# Patient Record
Sex: MALE | Race: ASIAN | NOT HISPANIC OR LATINO
[De-identification: names, ages, dates, MRNs, and addresses within clinical notes are randomized per-mention and may not be internally consistent; named-entity substitution may affect disease eponyms.]

---

## 2017-03-30 ENCOUNTER — APPOINTMENT (OUTPATIENT)
Dept: UROLOGY | Facility: CLINIC | Age: 72
End: 2017-03-30

## 2018-04-26 ENCOUNTER — APPOINTMENT (OUTPATIENT)
Dept: UROLOGY | Facility: CLINIC | Age: 73
End: 2018-04-26
Payer: MEDICARE

## 2018-04-26 PROCEDURE — 99214 OFFICE O/P EST MOD 30 MIN: CPT

## 2018-11-26 ENCOUNTER — APPOINTMENT (OUTPATIENT)
Dept: UROLOGY | Facility: CLINIC | Age: 73
End: 2018-11-26
Payer: MEDICARE

## 2018-11-26 PROCEDURE — 99214 OFFICE O/P EST MOD 30 MIN: CPT

## 2018-11-26 RX ORDER — DOCUSATE SODIUM 100 MG/1
100 CAPSULE ORAL
Qty: 60 | Refills: 3 | Status: ACTIVE | COMMUNITY
Start: 2018-11-26 | End: 1900-01-01

## 2018-11-27 ENCOUNTER — MEDICATION RENEWAL (OUTPATIENT)
Age: 73
End: 2018-11-27

## 2018-11-27 ENCOUNTER — EMERGENCY (EMERGENCY)
Facility: HOSPITAL | Age: 73
LOS: 1 days | Discharge: HOME | End: 2018-11-27
Attending: EMERGENCY MEDICINE
Payer: MEDICARE

## 2018-11-27 VITALS
SYSTOLIC BLOOD PRESSURE: 130 MMHG | TEMPERATURE: 98 F | DIASTOLIC BLOOD PRESSURE: 76 MMHG | OXYGEN SATURATION: 97 % | RESPIRATION RATE: 18 BRPM | HEART RATE: 82 BPM

## 2018-11-27 VITALS
WEIGHT: 113.98 LBS | OXYGEN SATURATION: 95 % | TEMPERATURE: 98 F | DIASTOLIC BLOOD PRESSURE: 84 MMHG | SYSTOLIC BLOOD PRESSURE: 142 MMHG | HEIGHT: 64 IN | HEART RATE: 94 BPM | RESPIRATION RATE: 19 BRPM

## 2018-11-27 LAB
ALBUMIN SERPL ELPH-MCNC: 4 G/DL — SIGNIFICANT CHANGE UP (ref 3.3–5)
ALP SERPL-CCNC: 64 U/L — SIGNIFICANT CHANGE UP (ref 40–120)
ALT FLD-CCNC: 7 U/L — LOW (ref 10–45)
ANION GAP SERPL CALC-SCNC: 15 MMOL/L — SIGNIFICANT CHANGE UP (ref 5–17)
APPEARANCE UR: ABNORMAL
AST SERPL-CCNC: 13 U/L — SIGNIFICANT CHANGE UP (ref 10–40)
BACTERIA # UR AUTO: NEGATIVE — SIGNIFICANT CHANGE UP
BASOPHILS # BLD AUTO: 0 K/UL — SIGNIFICANT CHANGE UP (ref 0–0.2)
BASOPHILS NFR BLD AUTO: 0.5 % — SIGNIFICANT CHANGE UP (ref 0–2)
BILIRUB SERPL-MCNC: 0.8 MG/DL — SIGNIFICANT CHANGE UP (ref 0.2–1.2)
BILIRUB UR-MCNC: ABNORMAL
BUN SERPL-MCNC: 22 MG/DL — SIGNIFICANT CHANGE UP (ref 7–23)
CALCIUM SERPL-MCNC: 9.4 MG/DL — SIGNIFICANT CHANGE UP (ref 8.4–10.5)
CHLORIDE SERPL-SCNC: 102 MMOL/L — SIGNIFICANT CHANGE UP (ref 96–108)
CO2 SERPL-SCNC: 20 MMOL/L — LOW (ref 22–31)
COLOR SPEC: ABNORMAL
CREAT SERPL-MCNC: 1.47 MG/DL — HIGH (ref 0.5–1.3)
DIFF PNL FLD: ABNORMAL
EOSINOPHIL # BLD AUTO: 0.1 K/UL — SIGNIFICANT CHANGE UP (ref 0–0.5)
EOSINOPHIL NFR BLD AUTO: 1.4 % — SIGNIFICANT CHANGE UP (ref 0–6)
EPI CELLS # UR: 4 /HPF — SIGNIFICANT CHANGE UP
GLUCOSE SERPL-MCNC: 94 MG/DL — SIGNIFICANT CHANGE UP (ref 70–99)
GLUCOSE UR QL: NEGATIVE — SIGNIFICANT CHANGE UP
HCT VFR BLD CALC: 39.5 % — SIGNIFICANT CHANGE UP (ref 39–50)
HGB BLD-MCNC: 13.3 G/DL — SIGNIFICANT CHANGE UP (ref 13–17)
HYALINE CASTS # UR AUTO: 15 /LPF — HIGH (ref 0–2)
KETONES UR-MCNC: NEGATIVE — SIGNIFICANT CHANGE UP
LEUKOCYTE ESTERASE UR-ACNC: ABNORMAL
LYMPHOCYTES # BLD AUTO: 1 K/UL — SIGNIFICANT CHANGE UP (ref 1–3.3)
LYMPHOCYTES # BLD AUTO: 10 % — LOW (ref 13–44)
MCHC RBC-ENTMCNC: 32.8 PG — SIGNIFICANT CHANGE UP (ref 27–34)
MCHC RBC-ENTMCNC: 33.6 GM/DL — SIGNIFICANT CHANGE UP (ref 32–36)
MCV RBC AUTO: 97.7 FL — SIGNIFICANT CHANGE UP (ref 80–100)
MONOCYTES # BLD AUTO: 1 K/UL — HIGH (ref 0–0.9)
MONOCYTES NFR BLD AUTO: 10.3 % — SIGNIFICANT CHANGE UP (ref 2–14)
NEUTROPHILS # BLD AUTO: 7.8 K/UL — HIGH (ref 1.8–7.4)
NEUTROPHILS NFR BLD AUTO: 77.7 % — HIGH (ref 43–77)
NITRITE UR-MCNC: POSITIVE
PH UR: 6 — SIGNIFICANT CHANGE UP (ref 5–8)
PLATELET # BLD AUTO: 186 K/UL — SIGNIFICANT CHANGE UP (ref 150–400)
POTASSIUM SERPL-MCNC: 3.5 MMOL/L — SIGNIFICANT CHANGE UP (ref 3.5–5.3)
POTASSIUM SERPL-SCNC: 3.5 MMOL/L — SIGNIFICANT CHANGE UP (ref 3.5–5.3)
PROT SERPL-MCNC: 7.7 G/DL — SIGNIFICANT CHANGE UP (ref 6–8.3)
PROT UR-MCNC: ABNORMAL
RBC # BLD: 4.04 M/UL — LOW (ref 4.2–5.8)
RBC # FLD: 13.2 % — SIGNIFICANT CHANGE UP (ref 10.3–14.5)
RBC CASTS # UR COMP ASSIST: 36 /HPF — HIGH (ref 0–4)
SODIUM SERPL-SCNC: 137 MMOL/L — SIGNIFICANT CHANGE UP (ref 135–145)
SP GR SPEC: 1.03 — HIGH (ref 1.01–1.02)
UROBILINOGEN FLD QL: ABNORMAL
WBC # BLD: 10.1 K/UL — SIGNIFICANT CHANGE UP (ref 3.8–10.5)
WBC # FLD AUTO: 10.1 K/UL — SIGNIFICANT CHANGE UP (ref 3.8–10.5)
WBC UR QL: 76 /HPF — HIGH (ref 0–5)

## 2018-11-27 PROCEDURE — 99284 EMERGENCY DEPT VISIT MOD MDM: CPT

## 2018-11-27 PROCEDURE — 80053 COMPREHEN METABOLIC PANEL: CPT

## 2018-11-27 PROCEDURE — 85027 COMPLETE CBC AUTOMATED: CPT

## 2018-11-27 PROCEDURE — 99283 EMERGENCY DEPT VISIT LOW MDM: CPT

## 2018-11-27 PROCEDURE — 87086 URINE CULTURE/COLONY COUNT: CPT

## 2018-11-27 PROCEDURE — 81001 URINALYSIS AUTO W/SCOPE: CPT

## 2018-11-27 RX ORDER — ASPIRIN/CALCIUM CARB/MAGNESIUM 324 MG
0 TABLET ORAL
Qty: 0 | Refills: 0 | COMMUNITY

## 2018-11-27 RX ORDER — PHENAZOPYRIDINE HYDROCHLORIDE 100 MG/1
100 TABLET ORAL 3 TIMES DAILY
Qty: 15 | Refills: 0 | Status: ACTIVE | COMMUNITY
Start: 2018-11-26 | End: 1900-01-01

## 2018-11-27 RX ORDER — FINASTERIDE 5 MG/1
1 TABLET, FILM COATED ORAL
Qty: 0 | Refills: 0 | COMMUNITY

## 2018-11-27 RX ORDER — SODIUM CHLORIDE 9 MG/ML
1000 INJECTION INTRAMUSCULAR; INTRAVENOUS; SUBCUTANEOUS ONCE
Qty: 0 | Refills: 0 | Status: COMPLETED | OUTPATIENT
Start: 2018-11-27 | End: 2018-11-27

## 2018-11-27 RX ORDER — GEMFIBROZIL 600 MG
1 TABLET ORAL
Qty: 0 | Refills: 0 | COMMUNITY

## 2018-11-27 RX ORDER — TAMSULOSIN HYDROCHLORIDE 0.4 MG/1
1 CAPSULE ORAL
Qty: 0 | Refills: 0 | COMMUNITY

## 2018-11-27 RX ORDER — METOCLOPRAMIDE HCL 10 MG
1 TABLET ORAL
Qty: 0 | Refills: 0 | COMMUNITY

## 2018-11-27 RX ADMIN — SODIUM CHLORIDE 1000 MILLILITER(S): 9 INJECTION INTRAMUSCULAR; INTRAVENOUS; SUBCUTANEOUS at 18:57

## 2018-11-27 NOTE — ED PROVIDER NOTE - PROGRESS NOTE DETAILS
nursing did post void and found only 60 mL. - Melissa Gore PA-C nursing did post void and found only 60 mL. - Melissa Gore PA-C.   Patient made aware of cre.  stanley gallardo/ radha.

## 2018-11-27 NOTE — ED PROVIDER NOTE - OBJECTIVE STATEMENT
72 y/o male hx BPH on tamsulosin and finasteride follows with Dr. Rodney Lutz who presents to the ED for 3 days of urinary burning and frequency. patient saw dr. Lutz yesterday who started him on cipro, pyridium and colace as he reports no BM in 2 days as well. no flank pain/hematuria. no fever/chills/n/v/d. took 3 doses of cipro and reports persistent symptoms.   translation provided by patients daughter at bedside, offered translation and declined.

## 2018-11-27 NOTE — ED ADULT NURSE NOTE - CHIEF COMPLAINT QUOTE
dysuria x 2 days, seen by urologist, prescribed cipro, took 3 doses without relief, (seen at hospital in [facility] for "stomache virus")  *returned from [facility] on nov 19

## 2018-11-27 NOTE — ED ADULT NURSE NOTE - OBJECTIVE STATEMENT
Pt bib daughter for further evaluation of dysuria over past few days.  He has taken 3 doses of Cipro, without improvement of sx.  Denies fevers, chills.

## 2018-11-27 NOTE — ED ADULT TRIAGE NOTE - CHIEF COMPLAINT QUOTE
dysuria x 2 days, seen by urologist, prescribed cipro, took 3 doses without relief, (seen at hospital in [facility] for "stomache virus")  *returned from [facility] on nov 19

## 2018-11-27 NOTE — ED ADULT NURSE REASSESSMENT NOTE - NS ED NURSE REASSESS COMMENT FT1
pt voided in bathroom at 1930; pt states the volume was "little"; Bladder scan done at 1935 was 62 cc; DREW NGUYEN made aware

## 2018-11-27 NOTE — ED PROVIDER NOTE - MEDICAL DECISION MAKING DETAILS
Miguelangel: Patient with uti diagnosed outpatient, here for increased in frequency of urination and dysuria. Patient took 3 doses of cipro. no fever, no chills. will get labs, ua, reassess.

## 2018-11-28 LAB
CULTURE RESULTS: NO GROWTH — SIGNIFICANT CHANGE UP
SPECIMEN SOURCE: SIGNIFICANT CHANGE UP

## 2018-12-02 LAB
APPEARANCE: ABNORMAL
BACTERIA UR CULT: ABNORMAL
BACTERIA: ABNORMAL
BILIRUBIN URINE: NEGATIVE
BLOOD URINE: ABNORMAL
C TRACH RRNA SPEC QL NAA+PROBE: NOT DETECTED
COLOR: ABNORMAL
GLUCOSE QUALITATIVE U: NEGATIVE MG/DL
HYALINE CASTS: 0 /LPF
KETONES URINE: NEGATIVE
LEUKOCYTE ESTERASE URINE: ABNORMAL
MICROSCOPIC-UA: NORMAL
N GONORRHOEA RRNA SPEC QL NAA+PROBE: NOT DETECTED
NITRITE URINE: POSITIVE
PH URINE: 6
PROTEIN URINE: 100 MG/DL
RED BLOOD CELLS URINE: 12 /HPF
SOURCE AMPLIFICATION: NORMAL
SPECIFIC GRAVITY URINE: 1.03
SQUAMOUS EPITHELIAL CELLS: 3 /HPF
UROBILINOGEN URINE: 1 MG/DL
WHITE BLOOD CELLS URINE: 20 /HPF

## 2018-12-13 LAB
APPEARANCE: CLEAR
BACTERIA UR CULT: ABNORMAL
BACTERIA: ABNORMAL
BILIRUBIN URINE: NEGATIVE
BLOOD URINE: ABNORMAL
COLOR: YELLOW
GLUCOSE QUALITATIVE U: NEGATIVE MG/DL
HYALINE CASTS: 0 /LPF
KETONES URINE: NEGATIVE
LEUKOCYTE ESTERASE URINE: ABNORMAL
MICROSCOPIC-UA: NORMAL
NITRITE URINE: POSITIVE
PH URINE: 6.5
PROTEIN URINE: ABNORMAL MG/DL
RED BLOOD CELLS URINE: 5 /HPF
SPECIFIC GRAVITY URINE: 1.02
SQUAMOUS EPITHELIAL CELLS: 2 /HPF
URINE COMMENTS: NORMAL
UROBILINOGEN URINE: NEGATIVE MG/DL
WHITE BLOOD CELLS URINE: 11 /HPF

## 2018-12-13 RX ORDER — CIPROFLOXACIN HYDROCHLORIDE 500 MG/1
500 TABLET, FILM COATED ORAL
Qty: 14 | Refills: 0 | Status: ACTIVE | COMMUNITY
Start: 2018-11-26 | End: 1900-01-01

## 2018-12-14 ENCOUNTER — CLINICAL ADVICE (OUTPATIENT)
Age: 73
End: 2018-12-14

## 2019-01-10 ENCOUNTER — LABORATORY RESULT (OUTPATIENT)
Age: 74
End: 2019-01-10

## 2019-01-11 LAB
APPEARANCE: ABNORMAL
BACTERIA UR CULT: NORMAL
BILIRUBIN URINE: NEGATIVE
BLOOD URINE: NEGATIVE
COLOR: YELLOW
GLUCOSE QUALITATIVE U: NEGATIVE MG/DL
KETONES URINE: NEGATIVE
LEUKOCYTE ESTERASE URINE: ABNORMAL
NITRITE URINE: NEGATIVE
PH URINE: 5.5
PROTEIN URINE: NEGATIVE MG/DL
SPECIFIC GRAVITY URINE: 1.02
UROBILINOGEN URINE: NEGATIVE MG/DL

## 2019-04-11 ENCOUNTER — APPOINTMENT (OUTPATIENT)
Dept: UROLOGY | Facility: CLINIC | Age: 74
End: 2019-04-11
Payer: MEDICARE

## 2019-04-11 DIAGNOSIS — B00.1 HERPESVIRAL VESICULAR DERMATITIS: ICD-10-CM

## 2019-04-11 PROBLEM — N40.0 BENIGN PROSTATIC HYPERPLASIA WITHOUT LOWER URINARY TRACT SYMPTOMS: Chronic | Status: ACTIVE | Noted: 2018-11-27

## 2019-04-11 PROCEDURE — 99214 OFFICE O/P EST MOD 30 MIN: CPT

## 2019-04-11 NOTE — LETTER BODY
[FreeTextEntry1] : Eliel Torres MD\par [address] [facility] [address]\[place] [address]\par [phone] (W)\par [phone] (F)\par \par Dear Dr. Torres,\par \par Reason for visit: BPH. Elevated PSA. \par \par This is a 73 -year-old gentleman with elevated PSA and chronic BPH status post 3 previously negative prostate biopsies. The patient returns today for follow up. Since the patient's previous visit, he continues to take Proscar and Flomax. Patient reports taking the medications regularly without any side effects or difficulties with the medication. Patient reports stable urinary flow on medications. He reports nocturia 2-3x. He notes a large urine output. His symptoms are aggravated by hydration. Patient denies any urinary retention or hematuria or changes in health. Patient has no pain. The past medical history and family history and social history are unchanged. All other review of systems are negative. Patient denies any changes in medications. Medication list was reconciled.\par \par On examination, the patient is a healthy-appearing gentleman in no acute distress. He is alert and oriented follows commands. He has normal mood and affect. He is normocephalic. Oral no thrush. Neck is supple. Respirations are unlabored. His abdomen is soft and nontender. Liver is nonpalpable. Bladder is nonpalpable. No CVA tenderness. Neurologically he is grossly intact. No peripheral edema. Skin without gross abnormality. He has normal male external genitalia. Normal meatus. Bilateral testes are descended intrascrotally and normal to palpation. On rectal examination, there is normal sphincter tone. The prostate is clinically benign without focal induration or nodularity.\par \par His most recent PSA in April 2018 was 3.19, which is within normal limits.\par \par Previous urinalysis was negative for hematuria or pyuria. Urine culture was negative. Chlamydia/ GC Amplification negative. \par \par Assessment: BPH, symptoms improved with Proscar and Flomax. Elevated PSA.\par \par I counseled the patient. He is doing well. His last PSA was normal. He will obtain BMP and PSA today to ensure stability. His urinary symptoms are stable on medications. I renewed his prescriptions for Flomax and Proscar today. I encouraged him to continue medications. I advised him to limit his fluid intake in the evening to relieve his nocturia. Risks and alternatives were discussed. I answered the patient questions. The patient will follow-up as directed and will contact me with any questions or concerns. Thank you for the opportunity to participate in the care of Mr. BARRIENTOS. I'll keep you updated on his progress.\par \par Plan: BMP. PSA. Continue Flomax and Proscar. Follow up in 1 year. \par \par

## 2019-04-11 NOTE — ADDENDUM
[FreeTextEntry1] : Entered by Karen Cancino, acting as scribe for Dr. Lutz. \par \par The documentation recorded by the scribe accurately reflects the service I personally performed and the decisions made by me.\par

## 2019-04-17 LAB
ANION GAP SERPL CALC-SCNC: 13 MMOL/L
BUN SERPL-MCNC: 19 MG/DL
CALCIUM SERPL-MCNC: 9.5 MG/DL
CHLORIDE SERPL-SCNC: 106 MMOL/L
CO2 SERPL-SCNC: 24 MMOL/L
CREAT SERPL-MCNC: 1.24 MG/DL
GLUCOSE SERPL-MCNC: 90 MG/DL
POTASSIUM SERPL-SCNC: 4.3 MMOL/L
PSA FREE FLD-MCNC: 23 %
PSA FREE SERPL-MCNC: 1.16 NG/ML
PSA SERPL-MCNC: 5.07 NG/ML
SODIUM SERPL-SCNC: 143 MMOL/L

## 2019-04-17 RX ORDER — ENEMA 19; 7 G/133ML; G/133ML
7-19 ENEMA RECTAL
Qty: 2 | Refills: 0 | Status: ACTIVE | COMMUNITY
Start: 2019-04-17 | End: 1900-01-01

## 2019-04-19 ENCOUNTER — OTHER (OUTPATIENT)
Age: 74
End: 2019-04-19

## 2019-04-25 NOTE — ED ADULT NURSE NOTE - NSIMPLEMENTINTERV_GEN_ALL_ED
Implemented All Universal Safety Interventions:  [place] to call system. Call bell, personal items and telephone within reach. Instruct patient to call for assistance. Room bathroom lighting operational. Non-slip footwear when patient is off stretcher. Physically safe environment: no spills, clutter or unnecessary equipment. Stretcher in lowest position, wheels locked, appropriate side rails in place.
never used

## 2019-06-01 ENCOUNTER — FORM ENCOUNTER (OUTPATIENT)
Age: 74
End: 2019-06-01

## 2019-06-02 ENCOUNTER — APPOINTMENT (OUTPATIENT)
Dept: MRI IMAGING | Facility: IMAGING CENTER | Age: 74
End: 2019-06-02
Payer: MEDICARE

## 2019-06-02 ENCOUNTER — OUTPATIENT (OUTPATIENT)
Dept: OUTPATIENT SERVICES | Facility: HOSPITAL | Age: 74
LOS: 1 days | End: 2019-06-02
Payer: MEDICARE

## 2019-06-02 ENCOUNTER — APPOINTMENT (OUTPATIENT)
Dept: CT IMAGING | Facility: IMAGING CENTER | Age: 74
End: 2019-06-02
Payer: MEDICARE

## 2019-06-02 DIAGNOSIS — N13.8 OTHER OBSTRUCTIVE AND REFLUX UROPATHY: ICD-10-CM

## 2019-06-02 DIAGNOSIS — N40.1 BENIGN PROSTATIC HYPERPLASIA WITH LOWER URINARY TRACT SYMPTOMS: ICD-10-CM

## 2019-06-02 DIAGNOSIS — R97.20 ELEVATED PROSTATE SPECIFIC ANTIGEN [PSA]: ICD-10-CM

## 2019-06-02 PROCEDURE — 71250 CT THORAX DX C-: CPT | Mod: 26

## 2019-06-02 PROCEDURE — 71250 CT THORAX DX C-: CPT

## 2019-06-02 PROCEDURE — 72197 MRI PELVIS W/O & W/DYE: CPT | Mod: 26

## 2019-06-02 PROCEDURE — 72197 MRI PELVIS W/O & W/DYE: CPT

## 2019-06-02 PROCEDURE — A9585: CPT

## 2019-06-08 ENCOUNTER — TRANSCRIPTION ENCOUNTER (OUTPATIENT)
Age: 74
End: 2019-06-08

## 2019-06-20 ENCOUNTER — APPOINTMENT (OUTPATIENT)
Dept: UROLOGY | Facility: CLINIC | Age: 74
End: 2019-06-20
Payer: MEDICARE

## 2019-06-20 PROCEDURE — 99214 OFFICE O/P EST MOD 30 MIN: CPT

## 2019-06-20 NOTE — ADDENDUM
[FreeTextEntry1] : Entered by Marissa Herring, acting as scribe for Dr. Rodney Lutz.\par \par The documentation recorded by the scribe accurately reflects the service I personally performed and the decisions made by me.

## 2019-06-20 NOTE — LETTER BODY
[FreeTextEntry1] : Eliel Torres MD\par [address] [facility] [address]\[place] [address]\par [phone] (W)\par [phone] (F)\par \par Dear Dr. Torres,\par \par Reason for visit: BPH. Elevated PSA. \par \par This is a 73 -year-old gentleman with elevated PSA and chronic BPH status post 3 previously negative prostate biopsies. The patient returns today for follow up. Since the patient's previous visit, he continues to take Proscar and Flomax regularly without any side effects or difficulties with the medication. Patient previously underwent prostate MRI demonstrating PIRADS 3 lesion. Patient reports of gross hematuria a few days ago. He reports nocturia 2-3x. He notes a large urine output. His symptoms are aggravated by hydration. Patient denies any urinary retention or hematuria or changes in health. Patient has no pain. The past medical history and family history and social history are unchanged. All other review of systems are negative. Patient denies any changes in medications. Medication list was reconciled.\par \par On examination, the patient is a healthy-appearing gentleman in no acute distress. He is alert and oriented follows commands. He has normal mood and affect. He is normocephalic. Oral no thrush. Neck is supple. Respirations are unlabored. His abdomen is soft and nontender. Liver is nonpalpable. Bladder is nonpalpable. No CVA tenderness. Neurologically he is grossly intact. No peripheral edema. Skin without gross abnormality. He has normal male external genitalia. Normal meatus. Bilateral testes are descended intrascrotally and normal to palpation. On rectal examination, there is normal sphincter tone. The prostate is clinically benign without focal induration or nodularity. His prostate is 71 cc.\par \par His Prostate MRI demonstrated PIRADS 3 lesion. His prostate is 71 cc, which is enlarged.\par \par Assessment: BPH, symptoms improved with Proscar and Flomax. Elevated PSA.\par \par I counseled the patient. He is doing well. His prostate MRI demonstrated PIRADS 3 lesion. His urinary symptoms are stable on medications. I renewed his prescriptions for Proscar today. I encouraged him to continue medications. I encourage the patient to increase Flomax to twice a day. I advised him to limit his fluid intake in the evening to relieve his nocturia. Risks and alternatives were discussed. I answered the patient questions. The patient will follow-up as directed and will contact me with any questions or concerns. Thank you for the opportunity to participate in the care of Mr. BARRIENTOS. I'll keep you updated on his progress.\par \par Plan: Increase Flomax to BID. Continue Proscar. Follow up in 6 months.

## 2019-06-23 LAB
APPEARANCE: CLEAR
BACTERIA: NEGATIVE
BILIRUBIN URINE: NEGATIVE
BLOOD URINE: NEGATIVE
COLOR: NORMAL
GLUCOSE QUALITATIVE U: NEGATIVE
HYALINE CASTS: 0 /LPF
KETONES URINE: NEGATIVE
LEUKOCYTE ESTERASE URINE: NEGATIVE
MICROSCOPIC-UA: NORMAL
NITRITE URINE: NEGATIVE
PH URINE: 6.5
PROTEIN URINE: NORMAL
RED BLOOD CELLS URINE: 2 /HPF
SPECIFIC GRAVITY URINE: 1.02
SQUAMOUS EPITHELIAL CELLS: 0 /HPF
URINE CYTOLOGY: NORMAL
UROBILINOGEN URINE: NORMAL
WHITE BLOOD CELLS URINE: 0 /HPF

## 2019-07-02 ENCOUNTER — TRANSCRIPTION ENCOUNTER (OUTPATIENT)
Age: 74
End: 2019-07-02

## 2019-12-12 ENCOUNTER — APPOINTMENT (OUTPATIENT)
Dept: UROLOGY | Facility: CLINIC | Age: 74
End: 2019-12-12
Payer: MEDICARE

## 2019-12-12 PROCEDURE — 99214 OFFICE O/P EST MOD 30 MIN: CPT

## 2019-12-12 NOTE — ADDENDUM
[FreeTextEntry1] : Entered by Jose Mccormick, acting as scribe for Dr. Rodney Lutz.\par \par The documentation recorded by the scribe accurately reflects the service I personally performed and the decisions made by me.

## 2019-12-12 NOTE — LETTER BODY
[FreeTextEntry1] : Eliel Torres MD\par [address] [facility] [address]\[place] [address]\par [phone] (W)\par [phone] (F)\par \par Dear Dr. Torres,\par \par Reason for visit: BPH. Elevated PSA. \par \par This is a 74 year-old Cantonese-speaking gentleman with elevated PSA and chronic BPH status post 3 previously negative prostate biopsies. Patient previously underwent prostate MRI demonstrating PIRADS 3 lesion. The patient returns today for follow up, accompanied by his wife. Since his last visit, the patient denies any interval difficulties or complaints. He continues to take Flomax twice a day and Proscar regularly without any side effects or difficulties with the medication. Patient denies any urinary retention, hematuria, or changes in health. Patient has no pain. The past medical history and family history and social history are unchanged. All other review of systems are negative. Patient denies any changes in medications. Medication list was reconciled.\par \par On examination, the patient is a healthy-appearing gentleman in no acute distress. He is alert and oriented follows commands. He has normal mood and affect. He is normocephalic. Oral no thrush. Neck is supple. Respirations are unlabored. His abdomen is soft and nontender. Liver is nonpalpable. Bladder is nonpalpable. No CVA tenderness. Neurologically he is grossly intact. No peripheral edema. Skin without gross abnormality. He has normal male external genitalia. Normal meatus. Bilateral testes are descended intrascrotally and normal to palpation. On rectal examination, there is normal sphincter tone. The prostate is clinically benign without focal induration or nodularity. \par \par His prostate MRI demonstrated PIRADS 3 lesion. His prostate is 71 cc, which is enlarged.\par \par His current PSA is 5, which is elevated.\par \par Assessment: BPH, symptoms improved with Proscar and Flomax BID. Elevated PSA.\par \par I counseled the patient. He is doing well. His urinary symptoms are stable on medications. I renewed the patient's prescription for Flomax BID and Proscar today. I encouraged the patient to continue medications regularly as directed. In terms of his elevated PSA, I recommend he repeat PSA today to ensure stability. Risks and alternatives were discussed. I answered the patient questions. The patient will follow-up as directed and will contact me with any questions or concerns. Thank you for the opportunity to participate in the care of Mr. BARRIENTOS. I'll keep you updated on his progress.\par \par Plan: Continue Flomax BID and Proscar. Repeat PSA. Follow up in 1 year.

## 2019-12-14 LAB
PSA FREE FLD-MCNC: 25 %
PSA FREE SERPL-MCNC: 1.06 NG/ML
PSA SERPL-MCNC: 4.21 NG/ML

## 2020-12-10 ENCOUNTER — APPOINTMENT (OUTPATIENT)
Dept: UROLOGY | Facility: CLINIC | Age: 75
End: 2020-12-10
Payer: MEDICARE

## 2020-12-10 VITALS
DIASTOLIC BLOOD PRESSURE: 82 MMHG | WEIGHT: 141 LBS | BODY MASS INDEX: 24.07 KG/M2 | SYSTOLIC BLOOD PRESSURE: 143 MMHG | TEMPERATURE: 97.7 F | HEIGHT: 64 IN | HEART RATE: 75 BPM

## 2020-12-10 PROCEDURE — 99214 OFFICE O/P EST MOD 30 MIN: CPT

## 2020-12-10 PROCEDURE — 99072 ADDL SUPL MATRL&STAF TM PHE: CPT

## 2020-12-10 NOTE — ADDENDUM
[FreeTextEntry1] : Entered by Beni Lynn, acting as scribe for Dr. Rodney Lutz.\par \par The documentation recorded by the scribe accurately reflects the service I personally performed and the decisions made by me.\par

## 2020-12-10 NOTE — LETTER BODY
[FreeTextEntry1] : Eliel Torres MD\par [address] [facility] [address]\[place] [address]\par [phone] (W)\par [phone] (F)\par \par Dear Dr. Torres,\par \par Reason for visit: BPH. Elevated PSA. \par \par This is a 74 year-old Cantonese-speaking gentleman with chronic BPH, and elevated PSA status post 3 previously negative prostate biopsies. His prostate MRI in June 2019 demonstrated an enlarged prostate of 71 cc, and a PI-RADS 3 lesion. He returns today for follow-up. Since he was last seen, the patient continues to report taking Flomax BID and Proscar regularly without any side effects or difficulties. He reports of stable urinary symptoms with medical therapy. Patient denies any urinary retention, hematuria, or changes in health. He denies any pain. The past medical history and family history and social history are unchanged. All other review of systems are negative. Patient denies any changes in medications. Medication list was reconciled.\par \par On examination, the patient is a healthy-appearing gentleman in no acute distress. He is alert and oriented follows commands. He has normal mood and affect. He is normocephalic. Oral no thrush. Neck is supple. Respirations are unlabored. His abdomen is soft and nontender. Liver is nonpalpable. Bladder is nonpalpable. No CVA tenderness. Neurologically he is grossly intact. No peripheral edema. Skin without gross abnormality. He has normal male external genitalia. Normal meatus. Bilateral testes are descended intrascrotally and normal to palpation. On rectal examination, there is normal sphincter tone. The prostate is clinically benign without focal induration or nodularity. \par \par His PSA is 4, which is improved. His previous PSA in December 2019 was 4.21.\par \par Assessment: BPH, symptoms stable with Flomax BID and Proscar. Elevated PSA.\par \par I counseled the patient. In terms of his BPH, since his symptoms are stable, I recommended he continue taking Flomax BID and Proscar. I renewed the patient's prescription for Flomax and Proscar today. I encouraged the patient to continue medications regularly as directed. In terms of his elevated PSA, I reassured the patient. His PSA is now 4, which is improved. He will follow-up in one year to ensure stability. Risks and alternatives were discussed. I answered the patient questions. The patient will follow-up as directed and will contact me with any questions or concerns. Thank you for the opportunity to participate in the care of Mr. BARRIENTOS. I will keep you updated on his progress.\par \par Plan: Continue Flomax BID and Proscar. Follow-up in 1 year.

## 2021-12-16 ENCOUNTER — APPOINTMENT (OUTPATIENT)
Dept: UROLOGY | Facility: CLINIC | Age: 76
End: 2021-12-16
Payer: MEDICARE

## 2021-12-16 PROCEDURE — 99214 OFFICE O/P EST MOD 30 MIN: CPT

## 2021-12-16 NOTE — LETTER BODY
[FreeTextEntry1] : Eliel Torres MD\par [address] [facility] [address]\[place] [address]\par [phone] (W)\par [phone] (F)\par \par Dear Dr. Torres,\par \par Reason for visit: BPH. Elevated PSA. \par \par This is a 76 year-old Cantonese-speaking gentleman with chronic BPH, and elevated PSA status post 3 previously negative prostate biopsies. His prostate MRI in June 2019 demonstrated an enlarged prostate of 71 cc, and a PI-RADS 3 lesion. He returns today for follow-up. Since he was last seen, the patient continues to report taking Flomax BID and Proscar regularly without any side effects or difficulties. He reports good uroflow and stable urinary symptoms with medical therapy. Patient denies any urinary retention, hematuria, or changes in health. He denies any pain. The past medical history and family history and social history are unchanged. All other review of systems are negative. Patient denies any changes in medications. Medication list was reconciled.\par \par On examination, the patient is a healthy-appearing gentleman in no acute distress. He is alert and oriented follows commands. He has normal mood and affect. He is normocephalic. Oral no thrush. Neck is supple. Respirations are unlabored. His abdomen is soft and nontender. Liver is nonpalpable. Bladder is nonpalpable. No CVA tenderness. Neurologically he is grossly intact. No peripheral edema. Skin without gross abnormality. He has normal male external genitalia. Normal meatus. Bilateral testes are descended intrascrotally and normal to palpation. On rectal examination, there is normal sphincter tone. The prostate is clinically benign without focal induration or nodularity. \par \par His BMP in June 2021 demonstrated normal renal functions, creatinine 1.10. His PSA is 4.01, which is stable and within normal limits.  His urinalysis demonstrated trace amounts of protein, but otherwise unremarkable. \par \par Assessment: BPH, symptoms stable with Flomax BID and Proscar. Elevated PSA.\par \par I counseled the patient. He is doing well. In terms of his BPH, the patient reports stable urinary symptoms with medical therapy.  I recommended he continue taking Flomax BID and Proscar. I renewed the patient's prescription for Flomax and Proscar today. I encouraged the patient to continue medications regularly as directed. In terms of his elevated PSA, I reassured the patient. His PSA is stable and within normal limits.  He will follow-up in one year to ensure stability. Risks and alternatives were discussed. I answered the patient questions. The patient will follow-up as directed and will contact me with any questions or concerns. Thank you for the opportunity to participate in the care of Mr. BARRIENTOS. I will keep you updated on his progress.\par \par Plan: Continue Flomax BID and Proscar. Follow-up in 1 year.

## 2021-12-16 NOTE — HISTORY OF PRESENT ILLNESS
[FreeTextEntry1] : Please refer to URO Consult note \par \par \par FUA elevated PSA and BPH \par PSA 4.01 stable \par PI-RADS 3 MRI - 2 years ago\par continue meds for BPH  \par come back in 1 yr

## 2021-12-16 NOTE — ADDENDUM
[FreeTextEntry1] : Entered by Marquita Cortez, acting as scribe for Dr. Rodney Lutz.\par \par The documentation recorded by the scribe accurately reflects the service I personally performed and the decisions made by me.

## 2022-12-22 ENCOUNTER — APPOINTMENT (OUTPATIENT)
Dept: UROLOGY | Facility: CLINIC | Age: 77
End: 2022-12-22

## 2022-12-22 DIAGNOSIS — R97.20 ELEVATED PROSTATE, SPECIFIC ANTIGEN [PSA]: ICD-10-CM

## 2022-12-22 DIAGNOSIS — Z00.00 ENCOUNTER FOR GENERAL ADULT MEDICAL EXAMINATION W/OUT ABNORMAL FINDINGS: ICD-10-CM

## 2022-12-22 LAB
ANION GAP SERPL CALC-SCNC: 14 MMOL/L
BUN SERPL-MCNC: 22 MG/DL
CALCIUM SERPL-MCNC: 9.7 MG/DL
CHLORIDE SERPL-SCNC: 106 MMOL/L
CO2 SERPL-SCNC: 20 MMOL/L
CREAT SERPL-MCNC: 1.2 MG/DL
EGFR: 63 ML/MIN/1.73M2
GLUCOSE SERPL-MCNC: 71 MG/DL
POTASSIUM SERPL-SCNC: 4.3 MMOL/L
PSA FREE FLD-MCNC: 30 %
PSA FREE SERPL-MCNC: 0.99 NG/ML
PSA SERPL-MCNC: 3.32 NG/ML
SODIUM SERPL-SCNC: 140 MMOL/L

## 2022-12-22 PROCEDURE — 99214 OFFICE O/P EST MOD 30 MIN: CPT

## 2022-12-22 NOTE — LETTER BODY
[FreeTextEntry1] : Eliel Torres MD\par [address] [facility] [address]\[place] [address]\par [phone] (W)\par [phone] (F)\par \par Dear Dr. Torres,\par \par Reason for visit: BPH. Elevated PSA. \par \par This is a 77 year-old Cantonese-speaking gentleman with chronic BPH, and elevated PSA status post 3 previously negative prostate biopsies. His prostate MRI in June 2019 demonstrated an enlarged prostate of 71 cc, and a PI-RADS 3 lesion. He returns today for follow-up. Since he was last seen, the patient continues to report taking Flomax BID and Proscar regularly without any side effects or difficulties. He reports good uroflow and stable urinary symptoms with medical therapy. Patient denies any urinary retention, hematuria, or changes in health. He denies any pain. The past medical history and family history and social history are unchanged. All other review of systems are negative. Patient denies any changes in medications. Medication list was reconciled.\par \par On examination, the patient is a healthy-appearing gentleman in no acute distress. He is alert and oriented follows commands. He has normal mood and affect. He is normocephalic. Oral no thrush. Neck is supple. Respirations are unlabored. His abdomen is soft and nontender. Liver is nonpalpable. Bladder is nonpalpable. No CVA tenderness. Neurologically he is grossly intact. No peripheral edema. Skin without gross abnormality. He has normal male external genitalia. Normal meatus. Bilateral testes are descended intrascrotally and normal to palpation. On rectal examination, there is normal sphincter tone. The prostate is clinically benign without focal induration or nodularity. \par \par His BMP in June 2021 demonstrated normal renal functions, creatinine 1.10. His PSA is 4.01, which is stable and within normal limits.  His urinalysis demonstrated trace amounts of protein, but otherwise unremarkable. \par \par Assessment: BPH, symptoms stable with Flomax BID and Proscar. Elevated PSA.\par \par I counseled the patient. He is doing well. In terms of his BPH, the patient reports stable urinary symptoms with medical therapy.  I recommended he continue taking Flomax BID and Proscar. I renewed the patient's prescription for Flomax and Proscar today. I encouraged the patient to continue medications regularly as directed. In terms of his elevated PSA, I reassured the patient. His PSA is stable and within normal limits. I encouraged him to repeat PSA and BMP to ensure stability. Risks and alternatives were discussed. I answered the patient questions. The patient will follow-up as directed and will contact me with any questions or concerns. Thank you for the opportunity to participate in the care of Mr. BARRIENTOS. I will keep you updated on his progress.\par \par Plan: Continue Flomax BID and Proscar. PSA. BMP. Follow-up in 1 year.

## 2022-12-22 NOTE — ADDENDUM
[FreeTextEntry1] : Entered by Adriana Noel, acting as scribe for Dr. Rodney Lutz.\par The documentation recorded by the scribe accurately reflects the service I personally performed and the decisions made by me.

## 2023-01-26 ENCOUNTER — NON-APPOINTMENT (OUTPATIENT)
Age: 78
End: 2023-01-26

## 2023-12-29 ENCOUNTER — EMERGENCY (EMERGENCY)
Facility: HOSPITAL | Age: 78
LOS: 1 days | Discharge: HOME | End: 2023-12-29
Attending: INTERNAL MEDICINE | Admitting: INTERNAL MEDICINE
Payer: COMMERCIAL

## 2023-12-29 VITALS
HEIGHT: 64 IN | TEMPERATURE: 99 F | HEART RATE: 94 BPM | WEIGHT: 149.91 LBS | SYSTOLIC BLOOD PRESSURE: 143 MMHG | DIASTOLIC BLOOD PRESSURE: 82 MMHG | OXYGEN SATURATION: 94 % | RESPIRATION RATE: 18 BRPM

## 2023-12-29 PROCEDURE — 99284 EMERGENCY DEPT VISIT MOD MDM: CPT

## 2023-12-30 VITALS
SYSTOLIC BLOOD PRESSURE: 121 MMHG | OXYGEN SATURATION: 95 % | TEMPERATURE: 98 F | HEART RATE: 80 BPM | RESPIRATION RATE: 18 BRPM | DIASTOLIC BLOOD PRESSURE: 60 MMHG

## 2023-12-30 LAB
ALBUMIN SERPL ELPH-MCNC: 3.5 G/DL — SIGNIFICANT CHANGE UP (ref 3.3–5)
ALBUMIN SERPL ELPH-MCNC: 3.5 G/DL — SIGNIFICANT CHANGE UP (ref 3.3–5)
ALP SERPL-CCNC: 39 U/L — LOW (ref 40–120)
ALP SERPL-CCNC: 39 U/L — LOW (ref 40–120)
ALT FLD-CCNC: 17 U/L — SIGNIFICANT CHANGE UP (ref 12–78)
ALT FLD-CCNC: 17 U/L — SIGNIFICANT CHANGE UP (ref 12–78)
ANION GAP SERPL CALC-SCNC: 2 MMOL/L — LOW (ref 5–17)
ANION GAP SERPL CALC-SCNC: 2 MMOL/L — LOW (ref 5–17)
APPEARANCE UR: CLEAR — SIGNIFICANT CHANGE UP
APPEARANCE UR: CLEAR — SIGNIFICANT CHANGE UP
AST SERPL-CCNC: 17 U/L — SIGNIFICANT CHANGE UP (ref 15–37)
AST SERPL-CCNC: 17 U/L — SIGNIFICANT CHANGE UP (ref 15–37)
BILIRUB SERPL-MCNC: 0.6 MG/DL — SIGNIFICANT CHANGE UP (ref 0.2–1.2)
BILIRUB SERPL-MCNC: 0.6 MG/DL — SIGNIFICANT CHANGE UP (ref 0.2–1.2)
BILIRUB UR-MCNC: NEGATIVE — SIGNIFICANT CHANGE UP
BILIRUB UR-MCNC: NEGATIVE — SIGNIFICANT CHANGE UP
BUN SERPL-MCNC: 26 MG/DL — HIGH (ref 7–23)
BUN SERPL-MCNC: 26 MG/DL — HIGH (ref 7–23)
CALCIUM SERPL-MCNC: 8.9 MG/DL — SIGNIFICANT CHANGE UP (ref 8.5–10.1)
CALCIUM SERPL-MCNC: 8.9 MG/DL — SIGNIFICANT CHANGE UP (ref 8.5–10.1)
CHLORIDE SERPL-SCNC: 110 MMOL/L — HIGH (ref 96–108)
CHLORIDE SERPL-SCNC: 110 MMOL/L — HIGH (ref 96–108)
CO2 SERPL-SCNC: 28 MMOL/L — SIGNIFICANT CHANGE UP (ref 22–31)
CO2 SERPL-SCNC: 28 MMOL/L — SIGNIFICANT CHANGE UP (ref 22–31)
COLOR SPEC: YELLOW — SIGNIFICANT CHANGE UP
COLOR SPEC: YELLOW — SIGNIFICANT CHANGE UP
CREAT SERPL-MCNC: 0.98 MG/DL — SIGNIFICANT CHANGE UP (ref 0.5–1.3)
CREAT SERPL-MCNC: 0.98 MG/DL — SIGNIFICANT CHANGE UP (ref 0.5–1.3)
DIFF PNL FLD: NEGATIVE — SIGNIFICANT CHANGE UP
DIFF PNL FLD: NEGATIVE — SIGNIFICANT CHANGE UP
EGFR: 80 ML/MIN/1.73M2 — SIGNIFICANT CHANGE UP
EGFR: 80 ML/MIN/1.73M2 — SIGNIFICANT CHANGE UP
GLUCOSE SERPL-MCNC: 91 MG/DL — SIGNIFICANT CHANGE UP (ref 70–99)
GLUCOSE SERPL-MCNC: 91 MG/DL — SIGNIFICANT CHANGE UP (ref 70–99)
GLUCOSE UR QL: NEGATIVE MG/DL — SIGNIFICANT CHANGE UP
GLUCOSE UR QL: NEGATIVE MG/DL — SIGNIFICANT CHANGE UP
HCT VFR BLD CALC: 37.5 % — LOW (ref 39–50)
HCT VFR BLD CALC: 37.5 % — LOW (ref 39–50)
HGB BLD-MCNC: 12.4 G/DL — LOW (ref 13–17)
HGB BLD-MCNC: 12.4 G/DL — LOW (ref 13–17)
KETONES UR-MCNC: NEGATIVE MG/DL — SIGNIFICANT CHANGE UP
KETONES UR-MCNC: NEGATIVE MG/DL — SIGNIFICANT CHANGE UP
LEUKOCYTE ESTERASE UR-ACNC: NEGATIVE — SIGNIFICANT CHANGE UP
LEUKOCYTE ESTERASE UR-ACNC: NEGATIVE — SIGNIFICANT CHANGE UP
MCHC RBC-ENTMCNC: 32.3 PG — SIGNIFICANT CHANGE UP (ref 27–34)
MCHC RBC-ENTMCNC: 32.3 PG — SIGNIFICANT CHANGE UP (ref 27–34)
MCHC RBC-ENTMCNC: 33.1 GM/DL — SIGNIFICANT CHANGE UP (ref 32–36)
MCHC RBC-ENTMCNC: 33.1 GM/DL — SIGNIFICANT CHANGE UP (ref 32–36)
MCV RBC AUTO: 97.7 FL — SIGNIFICANT CHANGE UP (ref 80–100)
MCV RBC AUTO: 97.7 FL — SIGNIFICANT CHANGE UP (ref 80–100)
NITRITE UR-MCNC: NEGATIVE — SIGNIFICANT CHANGE UP
NITRITE UR-MCNC: NEGATIVE — SIGNIFICANT CHANGE UP
NRBC # BLD: 0 /100 WBCS — SIGNIFICANT CHANGE UP (ref 0–0)
NRBC # BLD: 0 /100 WBCS — SIGNIFICANT CHANGE UP (ref 0–0)
PH UR: 5.5 — SIGNIFICANT CHANGE UP (ref 5–8)
PH UR: 5.5 — SIGNIFICANT CHANGE UP (ref 5–8)
PLATELET # BLD AUTO: 147 K/UL — LOW (ref 150–400)
PLATELET # BLD AUTO: 147 K/UL — LOW (ref 150–400)
POTASSIUM SERPL-MCNC: 4 MMOL/L — SIGNIFICANT CHANGE UP (ref 3.5–5.3)
POTASSIUM SERPL-MCNC: 4 MMOL/L — SIGNIFICANT CHANGE UP (ref 3.5–5.3)
POTASSIUM SERPL-SCNC: 4 MMOL/L — SIGNIFICANT CHANGE UP (ref 3.5–5.3)
POTASSIUM SERPL-SCNC: 4 MMOL/L — SIGNIFICANT CHANGE UP (ref 3.5–5.3)
PROT SERPL-MCNC: 7.2 G/DL — SIGNIFICANT CHANGE UP (ref 6–8.3)
PROT SERPL-MCNC: 7.2 G/DL — SIGNIFICANT CHANGE UP (ref 6–8.3)
PROT UR-MCNC: NEGATIVE MG/DL — SIGNIFICANT CHANGE UP
PROT UR-MCNC: NEGATIVE MG/DL — SIGNIFICANT CHANGE UP
RAPID RVP RESULT: SIGNIFICANT CHANGE UP
RAPID RVP RESULT: SIGNIFICANT CHANGE UP
RBC # BLD: 3.84 M/UL — LOW (ref 4.2–5.8)
RBC # BLD: 3.84 M/UL — LOW (ref 4.2–5.8)
RBC # FLD: 13.1 % — SIGNIFICANT CHANGE UP (ref 10.3–14.5)
RBC # FLD: 13.1 % — SIGNIFICANT CHANGE UP (ref 10.3–14.5)
SARS-COV-2 RNA SPEC QL NAA+PROBE: SIGNIFICANT CHANGE UP
SARS-COV-2 RNA SPEC QL NAA+PROBE: SIGNIFICANT CHANGE UP
SODIUM SERPL-SCNC: 140 MMOL/L — SIGNIFICANT CHANGE UP (ref 135–145)
SODIUM SERPL-SCNC: 140 MMOL/L — SIGNIFICANT CHANGE UP (ref 135–145)
SP GR SPEC: 1.02 — SIGNIFICANT CHANGE UP (ref 1–1.03)
SP GR SPEC: 1.02 — SIGNIFICANT CHANGE UP (ref 1–1.03)
UROBILINOGEN FLD QL: 1 MG/DL — SIGNIFICANT CHANGE UP (ref 0.2–1)
UROBILINOGEN FLD QL: 1 MG/DL — SIGNIFICANT CHANGE UP (ref 0.2–1)
WBC # BLD: 5.36 K/UL — SIGNIFICANT CHANGE UP (ref 3.8–10.5)
WBC # BLD: 5.36 K/UL — SIGNIFICANT CHANGE UP (ref 3.8–10.5)
WBC # FLD AUTO: 5.36 K/UL — SIGNIFICANT CHANGE UP (ref 3.8–10.5)
WBC # FLD AUTO: 5.36 K/UL — SIGNIFICANT CHANGE UP (ref 3.8–10.5)

## 2023-12-30 PROCEDURE — 99284 EMERGENCY DEPT VISIT MOD MDM: CPT | Mod: 25

## 2023-12-30 PROCEDURE — 36415 COLL VENOUS BLD VENIPUNCTURE: CPT

## 2023-12-30 PROCEDURE — 87086 URINE CULTURE/COLONY COUNT: CPT

## 2023-12-30 PROCEDURE — 71045 X-RAY EXAM CHEST 1 VIEW: CPT | Mod: 26

## 2023-12-30 PROCEDURE — 0225U NFCT DS DNA&RNA 21 SARSCOV2: CPT

## 2023-12-30 PROCEDURE — 71045 X-RAY EXAM CHEST 1 VIEW: CPT

## 2023-12-30 PROCEDURE — 85027 COMPLETE CBC AUTOMATED: CPT

## 2023-12-30 PROCEDURE — 81003 URINALYSIS AUTO W/O SCOPE: CPT

## 2023-12-30 PROCEDURE — 80053 COMPREHEN METABOLIC PANEL: CPT

## 2023-12-30 PROCEDURE — 96374 THER/PROPH/DIAG INJ IV PUSH: CPT

## 2023-12-30 RX ORDER — SODIUM CHLORIDE 9 MG/ML
1000 INJECTION INTRAMUSCULAR; INTRAVENOUS; SUBCUTANEOUS ONCE
Refills: 0 | Status: COMPLETED | OUTPATIENT
Start: 2023-12-30 | End: 2023-12-30

## 2023-12-30 RX ORDER — ONDANSETRON 8 MG/1
4 TABLET, FILM COATED ORAL ONCE
Refills: 0 | Status: COMPLETED | OUTPATIENT
Start: 2023-12-30 | End: 2023-12-30

## 2023-12-30 RX ORDER — ONDANSETRON 8 MG/1
1 TABLET, FILM COATED ORAL
Qty: 15 | Refills: 0
Start: 2023-12-30 | End: 2024-01-03

## 2023-12-30 RX ADMIN — SODIUM CHLORIDE 1000 MILLILITER(S): 9 INJECTION INTRAMUSCULAR; INTRAVENOUS; SUBCUTANEOUS at 01:27

## 2023-12-30 RX ADMIN — ONDANSETRON 4 MILLIGRAM(S): 8 TABLET, FILM COATED ORAL at 01:27

## 2023-12-30 NOTE — ED PROVIDER NOTE - PATIENT PORTAL LINK FT
You can access the FollowMyHealth Patient Portal offered by [facility] by registering at the following website: [email or web address]. By joining [employer]’s FollowMyHealth portal, you will also be able to view your health information using other applications (apps) compatible with our system. You can access the FollowMyHealth Patient Portal offered by [facility] by registering at the following website: [email or web address]. By joining [employer]’s FollowMyHealth portal, you will also be able to view your health information using other applications (apps) compatible with our system.

## 2023-12-30 NOTE — ED ADULT NURSE NOTE - NSFALLUNIVINTERV_ED_ALL_ED
Bed/Stretcher in lowest position, wheels locked, appropriate side rails in place/Call bell, personal items and telephone in reach/Instruct patient to call for assistance before getting out of bed/chair/stretcher/Non-slip footwear applied when patient is off stretcher/[place] to call system/Physically safe environment - no spills, clutter or unnecessary equipment/Purposeful proactive rounding/Room/bathroom lighting operational, light cord in reach Bed/Stretcher in lowest position, wheels locked, appropriate side rails in place/Call bell, personal items and telephone in reach/Instruct patient to call for assistance before getting out of bed/chair/stretcher/Non-slip footwear applied when patient is off stretcher/[place] to call system/Physically safe environment - no spills, clutter or unnecessary equipment/Purposeful proactive rounding/Room/bathroom lighting operational, light cord in reach

## 2023-12-30 NOTE — ED PROVIDER NOTE - OBJECTIVE STATEMENT
c/o weakness and nausea x 2 days ago speaks chinese and Cantonese  daughter at side  nausea, weakness  No c/o weakness and nausea x 2 days ago speaks chinese and Cantonese  daughter at side  nausea, weakness  chills, no fever. nausea no vomiting 77 y/o male c/o weakness and nausea x 2 days ago speaks chinese and Cantonese  daughter at side  no headache, no rash no toxemia, no chest pain, no SOB, no palpitations, no fever, no abdominal pain,  no v/d, no urinary symptoms, no GI  bleeding. no neuro changes. 79 y/o male c/o weakness and nausea x 2 days ago speaks chinese and Cantonese  daughter at side  no headache, no rash no toxemia, no chest pain, no SOB, no palpitations, no fever, no abdominal pain,  no v/d, no urinary symptoms, no GI  bleeding. no neuro changes.

## 2023-12-30 NOTE — ED ADULT NURSE NOTE - OBJECTIVE STATEMENT
Pt c/o weakness and nausea X 2 days, pt speaks cantonese but understands simple english instructions. no deficits noted on exam pt able to ambulate to restroom. family at bedside

## 2023-12-30 NOTE — ED PROVIDER NOTE - SIGNIFICANT NEGATIVE FINDINGS
no headache, no rash no toxemia, no chest pain, no SOB, no palpitations, no fever, no abdominal pain,  no v/d, no urinary symptoms, no GI  bleeding. no neuro changes.

## 2023-12-30 NOTE — ED PROVIDER NOTE - CARE PROVIDER_API CALL
Marky Rivers  Gastroenterology  [address] [place] [address]  Phone: [phone]  Fax: [phone]  Follow Up Time: 1-3 Days   Marky Rivers  Gastroenterology  [address] [place] [address]  Phone: [phone]  Fax: [phone]  Follow Up Time: 1-3 Days

## 2023-12-31 LAB
CULTURE RESULTS: SIGNIFICANT CHANGE UP
CULTURE RESULTS: SIGNIFICANT CHANGE UP
SPECIMEN SOURCE: SIGNIFICANT CHANGE UP
SPECIMEN SOURCE: SIGNIFICANT CHANGE UP

## 2024-01-04 ENCOUNTER — APPOINTMENT (OUTPATIENT)
Dept: UROLOGY | Facility: CLINIC | Age: 79
End: 2024-01-04
Payer: MEDICARE

## 2024-01-04 DIAGNOSIS — N13.8 BENIGN PROSTATIC HYPERPLASIA WITH LOWER URINARY TRACT SYMPMS: ICD-10-CM

## 2024-01-04 DIAGNOSIS — N40.1 BENIGN PROSTATIC HYPERPLASIA WITH LOWER URINARY TRACT SYMPMS: ICD-10-CM

## 2024-01-04 LAB
ANION GAP SERPL CALC-SCNC: 12 MMOL/L
BUN SERPL-MCNC: 24 MG/DL
CALCIUM SERPL-MCNC: 9.4 MG/DL
CHLORIDE SERPL-SCNC: 108 MMOL/L
CO2 SERPL-SCNC: 23 MMOL/L
CREAT SERPL-MCNC: 1.24 MG/DL
EGFR: 60 ML/MIN/1.73M2
GLUCOSE SERPL-MCNC: 128 MG/DL
POTASSIUM SERPL-SCNC: 4 MMOL/L
PSA FREE FLD-MCNC: 32 %
PSA FREE SERPL-MCNC: 0.79 NG/ML
PSA SERPL-MCNC: 2.51 NG/ML
SODIUM SERPL-SCNC: 143 MMOL/L

## 2024-01-04 PROCEDURE — 99214 OFFICE O/P EST MOD 30 MIN: CPT

## 2024-01-04 PROCEDURE — G2211 COMPLEX E/M VISIT ADD ON: CPT

## 2024-01-04 NOTE — HISTORY OF PRESENT ILLNESS
[FreeTextEntry1] : Follow-up BPH.  Flomax and Proscar.  Follow elevated PSA.  Repeat PSA.  Follow-up 1 year.  Patient PI-RADS 3 MRI in 2019.  Normal PSA density.  Please refer to URO Consult note

## 2024-01-04 NOTE — ADDENDUM
[FreeTextEntry1] : Entered by David Harrison, acting as scribe for Dr. Rodney Lutz. The documentation recorded by the scribe accurately reflects the service I personally performed and the decisions made by me.

## 2024-01-04 NOTE — LETTER BODY
[FreeTextEntry1] : Eliel Torres -[address] [facility] [address] [place] [address] [phone] (W) [phone] (F)  Dear Dr. Torres,  Reason for visit: BPH. Elevated PSA.    This is a 78 year-old Cantonese-speaking gentleman with chronic BPH, and elevated PSA status post 3 previously negative prostate biopsies. His prostate MRI in June 2019 demonstrated normal PSA density and an enlarged prostate of 71 cc, and a PI-RADS 3 lesion. He returns today for follow-up. Since he was last seen, the patient continues to report taking Flomax BID and Proscar regularly without any side effects or difficulties. He reports good uroflow and stable urinary symptoms with medical therapy. Patient denies any urinary retention, hematuria, or changes in health. He denies any pain. The past medical history and family history and social history are unchanged. All other review of systems are negative. Patient denies any changes in medications. Medication list was reconciled.    On examination, the patient is a healthy-appearing gentleman in no acute distress. He is alert and oriented follows commands. He has normal mood and affect. He is normocephalic. Oral no thrush. Neck is supple. Respirations are unlabored. His abdomen is soft and nontender. Liver is nonpalpable. Bladder is nonpalpable. No CVA tenderness. Neurologically he is grossly intact. No peripheral edema. Skin without gross abnormality. He has normal male external genitalia. Normal meatus. Bilateral testes are descended intrascrotally and normal to palpation. On rectal examination, there is normal sphincter tone. The prostate is clinically benign without focal induration or nodularity.    His BMP in December 2022 demonstrated normal renal functions, creatinine 1.20. His PSA is 3.32, which is stable and within normal limits. His urinalysis demonstrated trace amounts of protein, but otherwise unremarkable.    Assessment: BPH, symptoms stable with Flomax BID and Proscar. Elevated PSA.    I counseled the patient. He is doing well. In terms of his BPH, the patient reports stable urinary symptoms with medical therapy. I recommended he continue taking Flomax BID and Proscar. I renewed the patient's prescription for Flomax and Proscar today. I encouraged the patient to continue medications regularly as directed. In terms of his elevated PSA, I reassured the patient. His PSA is stable and within normal limits. I encouraged him to repeat PSA and BMP to ensure stability. Risks and alternatives were discussed. I answered the patient questions. The patient will follow-up as directed and will contact me with any questions or concerns. Thank you for the opportunity to participate in the care of Mr. BARRIENTOS. I will keep you updated on his progress.    Plan: Continue Flomax BID and Proscar. PSA. BMP. Follow-up in 1 year.

## 2025-01-09 ENCOUNTER — APPOINTMENT (OUTPATIENT)
Dept: UROLOGY | Facility: CLINIC | Age: 80
End: 2025-01-09
Payer: MEDICARE

## 2025-01-09 ENCOUNTER — NON-APPOINTMENT (OUTPATIENT)
Age: 80
End: 2025-01-09

## 2025-01-09 DIAGNOSIS — N13.8 BENIGN PROSTATIC HYPERPLASIA WITH LOWER URINARY TRACT SYMPMS: ICD-10-CM

## 2025-01-09 DIAGNOSIS — R97.20 ELEVATED PROSTATE, SPECIFIC ANTIGEN [PSA]: ICD-10-CM

## 2025-01-09 DIAGNOSIS — B00.1 HERPESVIRAL VESICULAR DERMATITIS: ICD-10-CM

## 2025-01-09 DIAGNOSIS — N40.1 BENIGN PROSTATIC HYPERPLASIA WITH LOWER URINARY TRACT SYMPMS: ICD-10-CM

## 2025-01-09 LAB
ANION GAP SERPL CALC-SCNC: 12 MMOL/L
BUN SERPL-MCNC: 18 MG/DL
CALCIUM SERPL-MCNC: 10.1 MG/DL
CHLORIDE SERPL-SCNC: 105 MMOL/L
CO2 SERPL-SCNC: 24 MMOL/L
CREAT SERPL-MCNC: 1.36 MG/DL
EGFR: 54 ML/MIN/1.73M2
GLUCOSE SERPL-MCNC: 99 MG/DL
POTASSIUM SERPL-SCNC: 4.2 MMOL/L
PSA FREE FLD-MCNC: 38 %
PSA FREE SERPL-MCNC: 1.13 NG/ML
PSA SERPL-MCNC: 2.94 NG/ML
SODIUM SERPL-SCNC: 142 MMOL/L

## 2025-01-09 PROCEDURE — G2211 COMPLEX E/M VISIT ADD ON: CPT

## 2025-01-09 PROCEDURE — 99214 OFFICE O/P EST MOD 30 MIN: CPT
